# Patient Record
(demographics unavailable — no encounter records)

---

## 2024-10-11 NOTE — HISTORY OF PRESENT ILLNESS
[FreeTextEntry6] :  Had diarrhea at the beginning of the week- intermittently mucousy but improving Eating well overall, drinking well + diaper rash No fever ? tugging ears

## 2025-01-03 NOTE — PHYSICAL EXAM
[Alert] : alert [No Acute Distress] : no acute distress [Normocephalic] : normocephalic [Anterior [place] Closed] : anterior fontanelle closed [Red Reflex Bilateral] : red reflex bilateral [PERRL] : PERRL [Normally Placed Ears] : normally placed ears [Auricles Well Formed] : auricles well formed [Clear Tympanic membranes with present light reflex and bony landmarks] : clear tympanic membranes with present light reflex and bony landmarks [Nares Patent] : nares patent [Palate Intact] : palate intact [Uvula Midline] : uvula midline [Tooth Eruption] : tooth eruption  [Supple, full passive range of motion] : supple, full passive range of motion [No Palpable Masses] : no palpable masses [Symmetric Chest Rise] : symmetric chest rise [Clear to Auscultation Bilaterally] : clear to auscultation bilaterally [Regular Rate and Rhythm] : regular rate and rhythm [S1, S2 present] : S1, S2 present [No Murmurs] : no murmurs [+2 Femoral Pulses] : +2 femoral pulses [Soft] : soft [NonTender] : non tender [Non Distended] : non distended [Normoactive Bowel Sounds] : normoactive bowel sounds [No Hepatomegaly] : no hepatomegaly [No Splenomegaly] : no splenomegaly [Central Urethral Opening] : central urethral opening [Testicles Descended Bilaterally] : testicles descended bilaterally [Patent] : patent [Normally Placed] : normally placed [No Abnormal Lymph Nodes Palpated] : no abnormal lymph nodes palpated [No Clavicular Crepitus] : no clavicular crepitus [Negative Morales-Ortalani] : negative Morales-Ortalani [Symmetric Buttocks Creases] : symmetric buttocks creases [No Spinal Dimple] : no spinal dimple [NoTuft of Hair] : no tuft of hair [Cranial Nerves Grossly Intact] : cranial nerves grossly intact [No Rash or Lesions] : no rash or lesions [FreeTextEntry1] : + intermittent wet cough [FreeTextEntry4] : + clear rhinorrhea

## 2025-01-03 NOTE — DEVELOPMENTAL MILESTONES
[Normal Development] : Normal Development [None] : none [Imitates scribbling] : imitates scribbling [Drinks from cup with little] : drinks from cup with little spilling [Points to ask for something] : points to ask for something or to get help [Uses 3 words other than names] : uses 3 words other than names [Speaks in sounds that seem like] : speaks in sounds that seem like an unknown language [Follows directions that do not] : follows direction that do not include a gesture [Looks when parent says,] : looks when parent says, "Where is...?" [Squats to  objects] : squats to  objects [Crawls up a few steps] : crawls up a few steps [Begins to run] : begins to run [Makes melissa with crayon] : makes melissa with davidyon [Drops object into and takes object] : drops object into and takes object out of container

## 2025-01-03 NOTE — HISTORY OF PRESENT ILLNESS
[Mother] : mother [Cow's milk (Ounces per day ___)] : consumes [unfilled] oz of cow's milk per day [Fruit] : fruit [Vegetables] : vegetables [Meat] : meat [Cereal] : cereal [Eggs] : eggs [Table food] : table food [___ stools per day] : [unfilled]  stools per day [Normal] : Normal [Brushing teeth] : Brushing teeth [Vitamin] : Primary Fluoride Source: Vitamin [Playtime] : Playtime [No] : No cigarette smoke exposure [Water heater temperature set at <120 degrees F] : Water heater temperature set at <120 degrees F [Car seat in back seat] : Car seat in back seat [Carbon Monoxide Detectors] : Carbon monoxide detectors [Smoke Detectors] : Smoke detectors [NO] : No [FreeTextEntry7] : 1 day of cough, congestion, runny nose- no fever, eating well, spit up mucous 2x. No diarrhea [FreeTextEntry3] : 1 nap daily, sleeping through the night [de-identified] : No more bottles;

## 2025-01-03 NOTE — DISCUSSION/SUMMARY
[FreeTextEntry1] :  15 mo M here for [facility].  Growing and developing appropriately to date- will recheck height when comes back for vaccines next week, patient not cooperating w/ measurement today.   Currently w/ URI. Recommend supportive care including antipyretics, fluids, humidifier, steamy shower, and nasal saline followed by nasal suction. Can trial zyrtec as recommended.  Monitor UO, ensure hydration.   Counseling: -Diet: Continue whole cow's milk. Continue table foods, 3 meals with 2-3 snacks per day. Incorporate water daily in a sippy cup.  -Oral Health: Brush teeth twice a day with soft toothbrush. Recommend visit to dentist.  -Safety: When in car, keep child in rear-facing car seats until age 2, or until  the maximum height and weight for seat is reached.  Ensure home is safe since baby is increasingly mobile. Be within arm's reach of baby at all times.  -Sleep: Put baby to sleep in own crib with no loose or soft bedding. Lower crib mattress.  -Help baby to maintain consistent daily routines and sleep schedule. Recognize stranger and separation anxiety. -Use consistent, positive discipline. Avoid screen time. Read aloud to baby.  Return in 3 mo for 18 mo well child check and in 1 week for vaccines (Hep A and prevnar).

## 2025-04-10 NOTE — HISTORY OF PRESENT ILLNESS
[de-identified] : Rash [FreeTextEntry6] : Rash on back x3 days ? itchy No new products--> Cereve, earth's mama lotion Soap--> cereve Detergent--> scott fragarance free No new URI symptoms No new food  Eating/ drinking well

## 2025-04-10 NOTE — HISTORY OF PRESENT ILLNESS
[de-identified] : Rash [FreeTextEntry6] : Rash on back x3 days ? itchy No new products--> Cereve, earth's mama lotion Soap--> cereve Detergent--> scott fragarance free No new URI symptoms No new food  Eating/ drinking well